# Patient Record
Sex: FEMALE | Race: OTHER | HISPANIC OR LATINO | Employment: UNEMPLOYED | URBAN - METROPOLITAN AREA
[De-identification: names, ages, dates, MRNs, and addresses within clinical notes are randomized per-mention and may not be internally consistent; named-entity substitution may affect disease eponyms.]

---
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
## 2025-02-22 ENCOUNTER — OFFICE VISIT (OUTPATIENT)
Dept: URGENT CARE | Facility: MEDICAL CENTER | Age: 20
End: 2025-02-22
Payer: COMMERCIAL

## 2025-02-22 VITALS
HEART RATE: 93 BPM | RESPIRATION RATE: 18 BRPM | OXYGEN SATURATION: 99 % | TEMPERATURE: 98.6 F | DIASTOLIC BLOOD PRESSURE: 77 MMHG | SYSTOLIC BLOOD PRESSURE: 119 MMHG

## 2025-02-22 DIAGNOSIS — J02.9 VIRAL PHARYNGITIS: ICD-10-CM

## 2025-02-22 DIAGNOSIS — K04.7 TOOTH ABSCESS: Primary | ICD-10-CM

## 2025-02-22 LAB — S PYO AG THROAT QL: NEGATIVE

## 2025-02-22 PROCEDURE — 99213 OFFICE O/P EST LOW 20 MIN: CPT

## 2025-02-22 PROCEDURE — 87880 STREP A ASSAY W/OPTIC: CPT

## 2025-02-22 PROCEDURE — S9083 URGENT CARE CENTER GLOBAL: HCPCS

## 2025-02-22 RX ORDER — AMOXICILLIN 500 MG/1
500 CAPSULE ORAL EVERY 12 HOURS SCHEDULED
Qty: 20 CAPSULE | Refills: 0 | Status: SHIPPED | OUTPATIENT
Start: 2025-02-22 | End: 2025-03-04

## 2025-02-22 RX ORDER — SYRINGE, DISPOSABLE, 1 ML
SYRINGE, EMPTY DISPOSABLE MISCELLANEOUS
COMMUNITY
Start: 2024-12-24

## 2025-02-22 RX ORDER — NEEDLES, DISPOSABLE 25GX5/8"
NEEDLE, DISPOSABLE MISCELLANEOUS
COMMUNITY
Start: 2024-12-30

## 2025-02-22 NOTE — PROGRESS NOTES
[facility]        NAME: Zulma Poole is a 19 y.o. adult  : 2005    MRN: [id number]  DATE: 2025  TIME: 2:35 PM    Assessment and Plan   Tooth abscess [K04.7]  1. Tooth abscess  amoxicillin (AMOXIL) 500 mg capsule      2. Viral pharyngitis  POCT rapid ANTIGEN strepA    CANCELED: Throat culture            Patient Instructions   Follow up with dentist within the next week for tooth abscess that is present on the molar of root canal  Motrin and tylenol as needed for pain relief   Rapid strep neg in office, and not sending off throat culture due to prescribing amoxicillin which covers for this  Continue to do warm salt gargles as needed for pain or discomfort  Return if pain worsens or if there are any other questions or concerns    Eat yogurt with live and active cultures and/or take a probiotic at least 3 hours before or after antibiotic dose. Monitor stool for diarrhea and/or blood. If this occurs, contact primary care doctor ASAP.      Follow up with PCP in 3-5 days.  Proceed to  ER if symptoms worsen.    If tests have been performed at [facility], our office will contact you with results if changes need to be made to the care plan discussed with you at the visit.  [facility]    Chief Complaint     Chief Complaint   Patient presents with    Sore Throat     Patient c/o sore throat and right side swollen tonsill x 5 days           History of Present Illness       Chirag Poole is a 19 yr old male with no significant past medical history who presents to the office for concerns of a sore throat and tonsil pain. He states that his sore throat has been ongoing for one week and he denies any fevers, cough, or congestion. He states that he does not feel sick and that he is not taking anything OTC. He admits that he is not having any abdominal pain or vomiting/nausea. He admits to normal urination and normal bowel movements. He also states that he has  "tooth pain that has been present for a few days on the same side that is hurting in his throat. He admits that he had a root canal and that there is no cap on the tooth yet. He also states that he has had an abscess before and that he did not mention this to the dentist.        Review of Systems   Review of Systems   Constitutional:  Negative for chills and fever.   HENT:  Positive for sore throat. Negative for ear pain.    Eyes:  Negative for pain and visual disturbance.   Respiratory:  Negative for cough and shortness of breath.    Cardiovascular:  Negative for chest pain and palpitations.   Gastrointestinal:  Negative for abdominal pain and vomiting.   Genitourinary:  Negative for dysuria and hematuria.   Musculoskeletal:  Negative for arthralgias and back pain.   Skin:  Negative for color change and rash.   Neurological:  Negative for seizures and syncope.   All other systems reviewed and are negative.        Current Medications       Current Outpatient Medications:     amoxicillin (AMOXIL) 500 mg capsule, Take 1 capsule (500 mg total) by mouth every 12 (twelve) hours for 10 days, Disp: 20 capsule, Rfl: 0    BD Disp Needles 18G X 1-1/2\" MISC, INJECT MEDICATION WEEKLY AS DIRECTED, Disp: , Rfl:     BD Syringe Slip Tip 25G X 5/8\" 1 ML MISC, INJECT MEDICATION WEEKLY AS DIRECTED, Disp: , Rfl:     B-D SYRINGE LUER-IRAJ 1CC 1 ML MISC, Use as directed, Disp: , Rfl:     testosterone cypionate (DEPO-TESTOSTERONE) 200 mg/mL SOLN, inject 0.3 MILLILITERS subcutaneously every week, Disp: , Rfl:     Current Allergies     Allergies as of 02/22/2025    (No Known Allergies)            The following portions of the patient's history were reviewed and updated as appropriate: allergies, current medications, past family history, past medical history, past social history, past surgical history and problem list.     Past Medical History:   Diagnosis Date    Osgood-Schlatter's disease of right lower extremity 9/15/2015    Overview:  Seen " by ortho 9/16/15, referred to PT.       History reviewed. No pertinent surgical history.    History reviewed. No pertinent family history.      Medications have been verified.        Objective   /77   Pulse 93   Temp 98.6 °F (37 °C)   Resp 18   SpO2 99%   No LMP recorded.       Physical Exam     Physical Exam  Constitutional:       General: He is not in acute distress.     Appearance: Normal appearance. He is well-developed and normal weight. He is not ill-appearing, toxic-appearing or diaphoretic.   HENT:      Head: Normocephalic and atraumatic.      Right Ear: Tympanic membrane, ear canal and external ear normal. No tenderness. No middle ear effusion. There is no impacted cerumen. Tympanic membrane is not erythematous.      Left Ear: Tympanic membrane, ear canal and external ear normal. No tenderness.  No middle ear effusion. There is no impacted cerumen. Tympanic membrane is not erythematous.      Nose: Nose normal. No congestion or rhinorrhea.      Mouth/Throat:      Lips: Pink. No lesions.      Mouth: Mucous membranes are moist. No injury, lacerations, oral lesions or angioedema.      Dentition: Does not have dentures. Gingival swelling and dental abscesses present. No dental tenderness, dental caries or gum lesions.      Pharynx: Oropharynx is clear. Posterior oropharyngeal erythema present. No pharyngeal swelling, oropharyngeal exudate or uvula swelling.      Tonsils: No tonsillar exudate.        Comments: Abscess present on the left upper gumline on the most posterior molar  Eyes:      General: No scleral icterus.        Right eye: No discharge.         Left eye: No discharge.      Extraocular Movements: Extraocular movements intact.      Conjunctiva/sclera: Conjunctivae normal.      Pupils: Pupils are equal, round, and reactive to light.   Neck:      Vascular: No carotid bruit.   Cardiovascular:      Rate and Rhythm: Normal rate and regular rhythm.      Pulses: Normal pulses.      Heart sounds:  Normal heart sounds. No murmur heard.     No friction rub. No gallop.   Pulmonary:      Effort: Pulmonary effort is normal. No respiratory distress.      Breath sounds: Normal breath sounds. No stridor. No wheezing, rhonchi or rales.   Chest:      Chest wall: No tenderness.   Musculoskeletal:      Cervical back: Normal range of motion. No rigidity or tenderness.   Lymphadenopathy:      Cervical: No cervical adenopathy.   Neurological:      Mental Status: He is alert.

## 2025-04-19 ENCOUNTER — OFFICE VISIT (OUTPATIENT)
Dept: URGENT CARE | Facility: MEDICAL CENTER | Age: 20
End: 2025-04-19
Payer: COMMERCIAL

## 2025-04-19 VITALS
HEART RATE: 81 BPM | OXYGEN SATURATION: 98 % | RESPIRATION RATE: 18 BRPM | SYSTOLIC BLOOD PRESSURE: 122 MMHG | DIASTOLIC BLOOD PRESSURE: 7 MMHG | TEMPERATURE: 98.2 F

## 2025-04-19 DIAGNOSIS — R53.83 OTHER FATIGUE: Primary | ICD-10-CM

## 2025-04-19 PROCEDURE — S9083 URGENT CARE CENTER GLOBAL: HCPCS | Performed by: FAMILY MEDICINE

## 2025-04-19 PROCEDURE — 99213 OFFICE O/P EST LOW 20 MIN: CPT | Performed by: FAMILY MEDICINE

## 2025-04-19 RX ORDER — TRETINOIN 0.05 G/100G
GEL TOPICAL
COMMUNITY
Start: 2025-02-24

## 2025-04-19 RX ORDER — CLINDAMYCIN PHOSPHATE 10 UG/ML
1 LOTION TOPICAL DAILY
COMMUNITY
Start: 2025-02-24

## 2025-04-19 RX ORDER — DOXYCYCLINE 100 MG/1
100 CAPSULE ORAL DAILY
COMMUNITY
Start: 2025-02-24 | End: 2025-05-25

## 2025-04-19 RX ORDER — TRETINOIN 0.5 MG/G
CREAM TOPICAL
COMMUNITY
Start: 2025-02-24

## 2025-04-19 RX ORDER — ISOTRETINOIN 30 MG/1
30 CAPSULE ORAL 2 TIMES DAILY
COMMUNITY
Start: 2025-03-26 | End: 2025-04-27

## 2025-04-19 NOTE — PROGRESS NOTES
[facility]        NAME: Zulma Poole is a 19 y.o. adult  : 2005    MRN: [id number]  DATE: 2025  TIME: 8:43 PM    Assessment and Plan   Other fatigue [R53.83]  1. Other fatigue          Discussed differential diagnosis of fatigue with patient and father  Reassured patient that he does not have any complications from the pharyngitis at this time  Fatigue may be caused by recovering from infection, stressors from school and anxiety  Continue rest, hydration and regular meals  Advised relaxation techniques, continue to monitor symptoms and follow-up PCP    Patient Instructions       Follow up with PCP in 3-5 days.  Proceed to  ER if symptoms worsen.    If tests have been performed at [facility], our office will contact you with results if changes need to be made to the care plan discussed with you at the visit.  [facility]    Chief Complaint     Chief Complaint   Patient presents with   • Fatigue     Patient c/o fatigue , swollen tonsill, and a broken right upper tooth x 1 week        History of Present Illness       HPI  Zulma Poole is a 19 y.o. adult  who presented to [facility] today accompanied by his father.  Patient states that was treated in [place] for pharyngitis and dental infection with Amoxicillin for 7 days.  He completed the medication this morning, but still has fatigue.  The throat pain and tooth has improved.  Pt has a upcoming appt with dental office for tooth evaluations.  + Nausea and headaches, difficulty concentrating.  Currently no fever, chills,a nausea, vomiting, diarrhea, ear pain, nasal congestion, headache  Sophomore student at [employer]      Review of Systems   Review of Systems   Constitutional:  Negative for chills and fever.   HENT:  Negative for congestion, rhinorrhea and sore throat.    Respiratory:  Negative for cough and shortness of breath.    Cardiovascular:  Negative for chest pain.  "  Gastrointestinal:  Negative for diarrhea, nausea and vomiting.   Skin:  Negative for rash.   Neurological:  Negative for dizziness and headaches.   Psychiatric/Behavioral:  Negative for sleep disturbance.          Current Medications       Current Outpatient Medications:   •  amoxicillin-clavulanate (AUGMENTIN) 875-125 mg per tablet, Take 1 tablet by mouth 2 (two) times a day, Disp: , Rfl:   •  benzoyl peroxide 5 % external liquid, Apply topically daily, Disp: , Rfl:   •  clindamycin (CLEOCIN T) 1 % lotion, Apply 1 application. topically daily, Disp: , Rfl:   •  doxycycline monohydrate (MONODOX) 100 mg capsule, Take 100 mg by mouth daily, Disp: , Rfl:   •  ISOtretinoin (ACCUTANE) 30 MG capsule, Take 30 mg by mouth 2 (two) times a day, Disp: , Rfl:   •  Retin-A 0.05 % cream, , Disp: , Rfl:   •  tretinoin (ALTRALIN) 0.05 %, Apply a very small amount to face at night., Disp: , Rfl:   •  B-D SYRINGE LUER-IRAJ 1CC 1 ML MISC, Use as directed, Disp: , Rfl:   •  BD Disp Needles 18G X 1-1/2\" MISC, INJECT MEDICATION WEEKLY AS DIRECTED, Disp: , Rfl:   •  BD Syringe Slip Tip 25G X 5/8\" 1 ML MISC, INJECT MEDICATION WEEKLY AS DIRECTED, Disp: , Rfl:   •  testosterone cypionate (DEPO-TESTOSTERONE) 200 mg/mL SOLN, inject 0.3 MILLILITERS subcutaneously every week, Disp: , Rfl:     Current Allergies     Allergies as of 04/19/2025   • (No Known Allergies)            The following portions of the patient's history were reviewed and updated as appropriate: allergies, current medications, past family history, past medical history, past social history, past surgical history and problem list.     Past Medical History:   Diagnosis Date   • Osgood-Schlatter's disease of right lower extremity 9/15/2015    Overview:  Seen by ortho 9/16/15, referred to PT.       No past surgical history on file.    No family history on file.      Medications have been verified.        Objective   BP (!) 122/7   Pulse 81   Temp 98.2 °F (36.8 °C)   Resp 18   " SpO2 98%   No LMP recorded.       Physical Exam     Physical Exam  Vitals and nursing note reviewed.   Constitutional:       Appearance: He is well-developed.   HENT:      Head: Normocephalic and atraumatic.      Right Ear: Tympanic membrane and external ear normal.      Left Ear: Tympanic membrane, ear canal and external ear normal.      Nose: Nose normal.      Mouth/Throat:      Mouth: Mucous membranes are moist.   Eyes:      Extraocular Movements: Extraocular movements intact.      Conjunctiva/sclera: Conjunctivae normal.      Pupils: Pupils are equal, round, and reactive to light.   Cardiovascular:      Rate and Rhythm: Normal rate and regular rhythm.      Heart sounds: Normal heart sounds.   Pulmonary:      Effort: Pulmonary effort is normal. No respiratory distress.      Breath sounds: Normal breath sounds.   Skin:     Findings: No rash.   Neurological:      Mental Status: He is alert and oriented to person, place, and time.   Psychiatric:         Mood and Affect: Mood normal.         Behavior: Behavior normal.

## 2025-04-19 NOTE — PATIENT INSTRUCTIONS
Patient Education     Fatigue   About this topic   Fatigue is a strong feeling of being tired and weak. This often happens after doing activities that are very hard to do. Then, you don't have much energy to do other things. Just as your body can be fatigued, your mind can as well. You might have trouble being able to think clearly about things. Some people have little desire to do anything. Fatigue is normal when you have had physical and mental activity. Stress can also cause fatigue. Other causes of fatigue can be the flu or other health problems, drugs, or sleep problems.  Fatigue can also be a sign of a more serious problem. Some of these are:  Low red blood cells  Anxiety or worrying too much  Low mood  Always being in pain  Problems with your thyroid, liver, or kidneys  Drug or alcohol use  Health problems like cancer, arthritis, and heart or lung disease  Most of the time, fatigue will get better after a few days of rest.     What are the causes?   Lifestyle causes like lack of sleep, working too much, unhealthy habits, or getting too little or too much exercise  Emotional causes like stress, low mood, grief, or being bored  Medical causes like illnesses or certain drugs that you take for those problems  What are the main signs?   Feeling tired or sleepy  Having no energy or feeling weak  Feeling worn out and needing to rest a lot  Not caring about work and other activities  How does the doctor diagnose this health problem?   Your doctor will take your history and do an exam. The doctor will ask you questions about how you feel. The doctor may order:  Lab tests  X-ray  CT scan  Electrocardiogram (ECG)  How does the doctor treat this health problem?   Your doctor will need to find out what is causing the problem to treat it. In many cases, more rest, sleep, a good diet, and less stress may help. Sometimes, the problem is caused by a more serious illness or problem. Your doctor will work to find the cause.  Your doctor may send you to an expert to help with low mood or worry.  What drugs may be needed?   The doctor may order drugs to:  Give extra vitamins and minerals  Treat the problem that causes the fatigue  What problems could happen?   Body's normal defenses or immune system are lowered  Not able to do the things you often do  Problems with sex  Not feeling hungry  Not being able to act as fast or do things as well. This could cause accidents when driving, at work, or at home.  Headaches, feeling angry, and not able to think clearly about things. These could cause you to not make good decisions.  Trouble with your memory or concentration  Having problems walking and exercising  Falling asleep during the day  Having problems seeing or seeing things that are not really there  Feeling like not doing things  What can be done to prevent this health problem?   Learn to cope well with your work and stress.  Do relaxation exercises.  Try to get enough sleep at night.  Eat healthy foods. Don't eat foods that have a lot of sugar.  Limit your alcohol intake  Last Reviewed Date   2021-02-24  Consumer Information Use and Disclaimer   This generalized information is a limited summary of diagnosis, treatment, and/or medication information. It is not meant to be comprehensive and should be used as a tool to help the user understand and/or assess potential diagnostic and treatment options. It does NOT include all information about conditions, treatments, medications, side effects, or risks that may apply to a specific patient. It is not intended to be medical advice or a substitute for the medical advice, diagnosis, or treatment of a health care provider based on the health care provider's examination and assessment of a patient’s specific and unique circumstances. Patients must speak with a health care provider for complete information about their health, medical questions, and treatment options, including any risks or benefits  regarding use of medications. This information does not endorse any treatments or medications as safe, effective, or approved for treating a specific patient. [employer] Inc[employer] and its affiliates disclaim any warranty or liability relating to this information or the use thereof. The use of this information is governed by the Terms of Use, available at https://www.[employer].com/en/know/clinical-effectiveness-terms   Copyright   Copyright © 2024 [employer], Inc[employer] and its affiliates and/or licensors. All rights reserved.

## 2025-05-28 ENCOUNTER — OFFICE VISIT (OUTPATIENT)
Dept: FAMILY MEDICINE CLINIC | Facility: CLINIC | Age: 20
End: 2025-05-28
Payer: COMMERCIAL

## 2025-05-28 VITALS
HEART RATE: 75 BPM | OXYGEN SATURATION: 99 % | SYSTOLIC BLOOD PRESSURE: 108 MMHG | BODY MASS INDEX: 22.49 KG/M2 | DIASTOLIC BLOOD PRESSURE: 76 MMHG | HEIGHT: 65 IN | WEIGHT: 135 LBS

## 2025-05-28 DIAGNOSIS — Z00.00 ANNUAL PHYSICAL EXAM: Primary | ICD-10-CM

## 2025-05-28 PROCEDURE — 99385 PREV VISIT NEW AGE 18-39: CPT | Performed by: NURSE PRACTITIONER

## 2025-05-28 NOTE — PROGRESS NOTES
Adult Annual Physical  Name: Zulma Poole      : 2005      MRN: [id number]  Encounter Provider: FÁTIMA Dalton  Encounter Date: 2025   Encounter department: [facility]    :  Assessment & Plan  Annual physical exam  Discussed routine labs   Patient is on Testosterone therapy for gender affirming care- obtaining through [facility]   No surgeries     Orders:  •  Lipid Panel with Direct LDL reflex; Future  •  Comprehensive metabolic panel  •  TSH, 3rd generation with Free T4 reflex; Future        Preventive Screenings:  - Diabetes Screening: risks/benefits discussed and orders placed  - Cholesterol Screening: risks/benefits discussed and orders placed   - Chlamydia Screening: screening not indicated   - Hepatitis C screening: screening not indicated   - HIV screening: screening not indicated   - Cervical cancer screening: screening not indicated   - Colon cancer screening: screening not indicated   - Lung cancer screening: screening not indicated     Counseling/Anticipatory Guidance:  - Alcohol: discussed moderation in alcohol intake and recommendations for healthy alcohol use.   - Dental health: discussed importance of regular tooth brushing, flossing, and dental visits.   - Sexual health: discussed sexually transmitted diseases, partner selection, use of condoms, avoidance of unintended pregnancy, and contraceptive alternatives.   - Diet: discussed recommendations for a healthy/well-balanced diet.   - Exercise: the importance of regular exercise/physical activity was discussed. Recommend exercise 3-5 times per week for at least 30 minutes.   - Injury prevention: discussed safety/seat belts, safety helmets, smoke detectors, carbon monoxide detectors, and smoking near bedding or upholstery.       Depression Screening and Follow-up Plan: Patient was screened for depression during today's encounter. They screened negative with a PHQ-2 score of 0.          History of Present Illness  "    Adult Annual Physical:  Patient presents for annual physical. Patient present today a new patient  Adopted- so no known family history   Has twin sister     Works at [employer]  Attends [employer] .     Diet and Physical Activity:  - Diet/Nutrition: no special diet.  - Exercise: strength training exercises, 5-7 times a week on average and 30-60 minutes on average.    Depression Screening:  - PHQ-2 Score: 0    General Health:  - Sleep: sleeps well.  - Hearing: normal hearing bilateral ears.  - Vision: most recent eye exam < 1 year ago and wears glasses and contacts.  - Dental: regular dental visits.    /GYN Health:    - Menopause: premenopausal.   - History of STDs: no  - Contraception:. not sexaully active- no periods stopped by testosterone therapy      Advanced Care Planning:  - Has an advanced directive?: no    - Has a durable medical POA?: no    - ACP document given to patient?: no      Review of Systems   Constitutional: Negative.    Respiratory: Negative.     Cardiovascular: Negative.    Gastrointestinal: Negative.    Neurological: Negative.    Psychiatric/Behavioral:  Negative for dysphoric mood and sleep disturbance. The patient is not nervous/anxious.          Objective   /76 (BP Location: Right arm, Patient Position: Sitting, Cuff Size: Adult)   Pulse 75   Ht 5' 5\" (1.651 m)   Wt 61.2 kg (135 lb)   SpO2 99%   BMI 22.47 kg/m²     Physical Exam  Vitals and nursing note reviewed.   Constitutional:       General: He is not in acute distress.     Appearance: He is well-developed and normal weight. He is not ill-appearing, toxic-appearing or diaphoretic.   HENT:      Head: Normocephalic and atraumatic.      Right Ear: Tympanic membrane and external ear normal.      Left Ear: Tympanic membrane and external ear normal.      Nose: Nose normal.      Mouth/Throat:      Mouth: Mucous membranes are moist.      Pharynx: Uvula midline. No oropharyngeal exudate or posterior oropharyngeal erythema.     Eyes:    "   General: No scleral icterus.     Conjunctiva/sclera: Conjunctivae normal.      Pupils: Pupils are equal, round, and reactive to light.     Neck:      Thyroid: No thyromegaly.      Vascular: No carotid bruit or JVD.     Cardiovascular:      Rate and Rhythm: Normal rate and regular rhythm.      Pulses:           Carotid pulses are 2+ on the right side and 2+ on the left side.     Heart sounds: Normal heart sounds.   Pulmonary:      Effort: Pulmonary effort is normal. No respiratory distress.      Breath sounds: Normal breath sounds.   Abdominal:      General: Bowel sounds are normal. There is no distension.      Palpations: Abdomen is soft.      Tenderness: There is no abdominal tenderness.     Musculoskeletal:      Cervical back: No tenderness.      Right lower leg: No edema.      Left lower leg: No edema.   Lymphadenopathy:      Cervical: No cervical adenopathy.     Skin:     General: Skin is warm and dry.     Neurological:      Mental Status: He is alert and oriented to person, place, and time.      Motor: Motor function is intact.      Gait: Gait is intact. Gait normal.     Psychiatric:         Attention and Perception: Attention normal.         Mood and Affect: Mood normal.         Speech: Speech normal.         Behavior: Behavior normal. Behavior is cooperative.         Thought Content: Thought content normal.         Judgment: Judgment normal.

## 2025-06-04 ENCOUNTER — APPOINTMENT (OUTPATIENT)
Dept: LAB | Facility: CLINIC | Age: 20
End: 2025-06-04
Payer: COMMERCIAL

## 2025-06-04 DIAGNOSIS — Z00.00 ANNUAL PHYSICAL EXAM: ICD-10-CM

## 2025-06-04 LAB
ALBUMIN SERPL BCG-MCNC: 4.8 G/DL (ref 3.5–5)
ALP SERPL-CCNC: 92 U/L (ref 34–104)
ALT SERPL W P-5'-P-CCNC: 14 U/L (ref 7–52)
ANION GAP SERPL CALCULATED.3IONS-SCNC: 7 MMOL/L (ref 4–13)
AST SERPL W P-5'-P-CCNC: 17 U/L (ref 5–45)
BILIRUB SERPL-MCNC: 1.21 MG/DL (ref 0.2–1)
BUN SERPL-MCNC: 8 MG/DL (ref 5–25)
CALCIUM SERPL-MCNC: 9.5 MG/DL (ref 8.4–10.2)
CHLORIDE SERPL-SCNC: 103 MMOL/L (ref 96–108)
CHOLEST SERPL-MCNC: 153 MG/DL (ref ?–200)
CO2 SERPL-SCNC: 30 MMOL/L (ref 21–32)
CREAT SERPL-MCNC: 0.74 MG/DL (ref 0.6–1.3)
GLUCOSE P FAST SERPL-MCNC: 86 MG/DL (ref 65–99)
HDLC SERPL-MCNC: 46 MG/DL
LDLC SERPL CALC-MCNC: 93 MG/DL (ref 0–100)
POTASSIUM SERPL-SCNC: 3.6 MMOL/L (ref 3.5–5.3)
PROT SERPL-MCNC: 7.3 G/DL (ref 6.4–8.4)
SODIUM SERPL-SCNC: 140 MMOL/L (ref 135–147)
TRIGL SERPL-MCNC: 68 MG/DL (ref ?–150)
TSH SERPL DL<=0.05 MIU/L-ACNC: 0.94 UIU/ML (ref 0.45–4.5)

## 2025-06-04 PROCEDURE — 84443 ASSAY THYROID STIM HORMONE: CPT

## 2025-06-04 PROCEDURE — 80053 COMPREHEN METABOLIC PANEL: CPT | Performed by: NURSE PRACTITIONER

## 2025-06-04 PROCEDURE — 80061 LIPID PANEL: CPT

## 2025-06-04 PROCEDURE — 36415 COLL VENOUS BLD VENIPUNCTURE: CPT | Performed by: NURSE PRACTITIONER

## 2025-06-13 ENCOUNTER — OFFICE VISIT (OUTPATIENT)
Dept: FAMILY MEDICINE CLINIC | Facility: CLINIC | Age: 20
End: 2025-06-13
Payer: COMMERCIAL

## 2025-06-13 VITALS
WEIGHT: 130.8 LBS | DIASTOLIC BLOOD PRESSURE: 78 MMHG | OXYGEN SATURATION: 99 % | HEART RATE: 96 BPM | BODY MASS INDEX: 21.79 KG/M2 | HEIGHT: 65 IN | SYSTOLIC BLOOD PRESSURE: 102 MMHG

## 2025-06-13 DIAGNOSIS — D22.9 BENIGN MOLE: Primary | ICD-10-CM

## 2025-06-13 PROCEDURE — 99213 OFFICE O/P EST LOW 20 MIN: CPT | Performed by: NURSE PRACTITIONER

## 2025-06-13 NOTE — PROGRESS NOTES
"Name: Zulma Poole      : 2005      MRN: [id number]  Encounter Provider: FÁTIMA Dalton  Encounter Date: 2025   Encounter department: [facility]  :  Assessment & Plan  Benign mole  Recommend yearly evaluation of mole  Advised on red flag symptoms   Defer dermatology for now               History of Present Illness   Patient has a mole under left breast that has been there a few months   The left palm is more recent      No change in size of color         Review of Systems   Skin:         HPI       Objective   /78 (BP Location: Left arm, Patient Position: Sitting, Cuff Size: Adult)   Pulse 96   Ht 5' 5\" (1.651 m)   Wt 59.3 kg (130 lb 12.8 oz)   SpO2 99%   BMI 21.77 kg/m²      Physical Exam  Vitals and nursing note reviewed.   Constitutional:       Appearance: Normal appearance.     Cardiovascular:      Rate and Rhythm: Regular rhythm.      Heart sounds: Normal heart sounds.     Skin:     Findings: Rash present. Rash is macular (lightly pigmented mole left breast).     Neurological:      Mental Status: He is oriented to person, place, and time.     Psychiatric:         Mood and Affect: Mood normal.         Behavior: Behavior normal.         Thought Content: Thought content normal.         Judgment: Judgment normal.             "

## 2025-06-16 ENCOUNTER — RESULTS FOLLOW-UP (OUTPATIENT)
Dept: FAMILY MEDICINE CLINIC | Facility: CLINIC | Age: 20
End: 2025-06-16